# Patient Record
Sex: FEMALE | Race: OTHER | Employment: OTHER | URBAN - METROPOLITAN AREA
[De-identification: names, ages, dates, MRNs, and addresses within clinical notes are randomized per-mention and may not be internally consistent; named-entity substitution may affect disease eponyms.]

---

## 2018-11-28 NOTE — PROCEDURE NOTE: CLINICAL
PROCEDURE NOTE: Lucentis 0.5mg PFS OS. Diagnosis: Branch Retinal Vein Occlusion with Macular Edema. Anesthesia: Topical. Prep: Betadine Flush. Prior to injection, risks/benefits/alternatives discussed including but not limited to infection, loss of vision or eye, hemorrhage, cataract, glaucoma, retinal tears or detachment. The patient wished to proceed with treatment. Topical anesthesia was induced with Alcaine. Additional anesthesia was achieved using drop(s) or injection checked above. A drop of Povidone-iodine 5% ophthalmic solution was instilled over the injection site and in the inferior fornix. Betadine prep was performed. A single use prefilled syringe of intravitreal Lucentis 0.5mg/0.05ml was used and excess discarded. The needle was passed 3.0 mm posterior to the limbus in pseudophakic patients, and 3.5 mm posterior to the limbus in phakic patients. The remainder of the Lucentis 0.5mg in the single-use vial was then discarded in a medical waste disposal container. The eye was irrigated with sterile irrigating solution. Patient tolerated the procedure well. There were no complications. Post procedure instructions given. CF vision checked. Injection Time *. The patient was instructed to return for re-evaluation in approximately 4-12 weeks depending on his/her condition and was told to call immediately if vision decreases and/or if his/her eye becomes red, painful, and/or light sensitive. The patient was instructed to go to the emergency room or call 911 if unable to reach the doctor within an hour or two of trying or calling. [facility]

## 2019-01-02 NOTE — PROCEDURE NOTE: CLINICAL
PROCEDURE NOTE: Lucentis 0.5mg PFS OS. Diagnosis: Branch Retinal Vein Occlusion with Macular Edema. Anesthesia: Lidocaine 4%. Prep: Betadine Flush. Prior to injection, risks/benefits/alternatives discussed including but not limited to infection, loss of vision or eye, hemorrhage, cataract, glaucoma, retinal tears or detachment. The patient wished to proceed with treatment. Topical anesthesia was induced with Alcaine. Additional anesthesia was achieved using drop(s) or injection checked above. A drop of Povidone-iodine 5% ophthalmic solution was instilled over the injection site and in the inferior fornix. Betadine prep was performed. A single use prefilled syringe of intravitreal Lucentis 0.5mg/0.05ml was used and excess discarded. The needle was passed 3.0 mm posterior to the limbus in pseudophakic patients, and 3.5 mm posterior to the limbus in phakic patients. The remainder of the Lucentis 0.5mg in the single-use vial was then discarded in a medical waste disposal container. The eye was irrigated with sterile irrigating solution. Patient tolerated the procedure well. There were no complications. Post procedure instructions given. CF vision checked. Injection Time 9:05AM. The patient was instructed to return for re-evaluation in approximately 4-12 weeks depending on his/her condition and was told to call immediately if vision decreases and/or if his/her eye becomes red, painful, and/or light sensitive. The patient was instructed to go to the emergency room or call 911 if unable to reach the doctor within an hour or two of trying or calling. Brea Beverly

## 2019-02-27 NOTE — PATIENT DISCUSSION
Reviewed that they tend to [address] over a few weeks, and then may not happen for many years. Patient instructed to return if they do not clear over a few weeks for further evaluation.

## 2019-02-27 NOTE — PATIENT DISCUSSION
11 28 18 - Reviewed OPTIONS including AVASTIN/EYLEA/LUCENTIS and patient wants to proceed with LUCENTIS treatment AND REPEAT EVERY 5 WKS X 2.

## 2019-03-12 NOTE — PATIENT DISCUSSION
Reviewed that they tend to [address] over a few weeks, and then may not happen for many years. Patient instructed to return if they do not clear over a few weeks for further evaluation.

## 2019-03-18 NOTE — PROCEDURE NOTE: CLINICAL
PROCEDURE NOTE: Lucentis 0.5mg PFS OS. Diagnosis: Branch Retinal Vein Occlusion with Macular Edema. Anesthesia: Topical. Prep: Betadine Flush. Prior to injection, risks/benefits/alternatives discussed including but not limited to infection, loss of vision or eye, hemorrhage, cataract, glaucoma, retinal tears or detachment. The patient wished to proceed with treatment. Topical anesthesia was induced with Alcaine. Additional anesthesia was achieved using drop(s) or injection checked above. A drop of Povidone-iodine 5% ophthalmic solution was instilled over the injection site and in the inferior fornix. Betadine prep was performed. A single use prefilled syringe of intravitreal Lucentis 0.5mg/0.05ml was used and excess discarded. The needle was passed 3.0 mm posterior to the limbus in pseudophakic patients, and 3.5 mm posterior to the limbus in phakic patients. The remainder of the Lucentis 0.5mg in the single-use vial was then discarded in a medical waste disposal container. The eye was irrigated with sterile irrigating solution. Patient tolerated the procedure well. There were no complications. Post procedure instructions given. CF vision checked. Injection Time 9:54AM. The patient was instructed to return for re-evaluation in approximately 4-12 weeks depending on his/her condition and was told to call immediately if vision decreases and/or if his/her eye becomes red, painful, and/or light sensitive. The patient was instructed to go to the emergency room or call 911 if unable to reach the doctor within an hour or two of trying or calling. Rebecca Jones

## 2019-03-18 NOTE — PATIENT DISCUSSION
Reviewed that they tend to [address] over a few weeks, and then may not happen for many years. Patient instructed to return if they do not clear over a few weeks for further evaluation.

## 2019-05-06 NOTE — PATIENT DISCUSSION
Reviewed that they tend to [address] over a few weeks, and then may not happen for many years. Patient instructed to return if they do not clear over a few weeks for further evaluation.

## 2019-05-06 NOTE — PROCEDURE NOTE: CLINICAL
PROCEDURE NOTE: Lucentis 0.5mg PFS OS. Diagnosis: Branch Retinal Vein Occlusion with Macular Edema. Prior to injection, risks/benefits/alternatives discussed including but not limited to infection, loss of vision or eye, hemorrhage, cataract, glaucoma, retinal tears or detachment. The patient wished to proceed with treatment. Topical anesthesia was induced with Alcaine. Additional anesthesia was achieved using drop(s) or injection checked above. A drop of Povidone-iodine 5% ophthalmic solution was instilled over the injection site and in the inferior fornix. Betadine prep was performed. A single use prefilled syringe of intravitreal Lucentis 0.5mg/0.05ml was used and excess discarded. The needle was passed 3.0 mm posterior to the limbus in pseudophakic patients, and 3.5 mm posterior to the limbus in phakic patients. The remainder of the Lucentis 0.5mg in the single-use vial was then discarded in a medical waste disposal container. The eye was irrigated with sterile irrigating solution. Patient tolerated the procedure well. There were no complications. Post procedure instructions given. CF vision checked. Injection Time 9:13AM. The patient was instructed to return for re-evaluation in approximately 4-12 weeks depending on his/her condition and was told to call immediately if vision decreases and/or if his/her eye becomes red, painful, and/or light sensitive. The patient was instructed to go to the emergency room or call 911 if unable to reach the doctor within an hour or two of trying or calling. Maximiliana Braden PROCEDURE NOTE: Bandage Contact Lens OS. Diagnosis: Dry Eye Syndrome. Prior to treatment, the risks/benefits/alternatives were discussed. The patient wished to proceed with procedure. A topical anesthetic, proparacaine, was administered. A bandage contact lens was fitted for treatment of ocular surface disease. Night and day contact lens (-0.00, 13.8, 8.6), fitting and placement done without complication. Expiration date: *. Lot #: *. Patient tolerated procedure well. There were no complications. Post procedure instructions given. The patient tolerated the procedure well and left in good condition. Patient is instructed to make sure it is removed in 24 hours, either here, at home, or by another eye doctor. Yamilethvia Braden

## 2019-05-06 NOTE — PATIENT DISCUSSION
PT C/O IRRITATION FOLLOWING BRITTANY - SPK - BCL PLACED - PT TO REMOVE AT [facility] - HX OF CL USE.

## 2019-08-19 ENCOUNTER — CATARACT CONSULT (OUTPATIENT)
Dept: URBAN - METROPOLITAN AREA CLINIC 43 | Facility: CLINIC | Age: 74
End: 2019-08-19

## 2019-08-19 DIAGNOSIS — H18.59: ICD-10-CM

## 2019-08-19 DIAGNOSIS — H04.123: ICD-10-CM

## 2019-08-19 DIAGNOSIS — H25.811: ICD-10-CM

## 2019-08-19 DIAGNOSIS — H25.812: ICD-10-CM

## 2019-08-19 DIAGNOSIS — H43.393: ICD-10-CM

## 2019-08-19 PROCEDURE — 92134 CPTRZ OPH DX IMG PST SGM RTA: CPT

## 2019-08-19 PROCEDURE — 99204 OFFICE O/P NEW MOD 45 MIN: CPT

## 2019-08-19 PROCEDURE — 92025 CPTRIZED CORNEAL TOPOGRAPHY: CPT

## 2019-08-19 RX ORDER — PREDNISOLONE ACETATE 10 MG/ML: 1 SUSPENSION/ DROPS OPHTHALMIC

## 2019-08-19 RX ORDER — MOXIFLOXACIN HYDROCHLORIDE 5 MG/ML: 1 SOLUTION/ DROPS OPHTHALMIC

## 2019-08-19 ASSESSMENT — VISUAL ACUITY
OS_SC: >J12
OS_SC: 20/400+1
OS_BAT: 20/400
OS_CC: J2
OD_SC: CF 6FT
OS_CC: 20/50-2
OD_BAT: 20/400
OD_SC: >J12
OD_CC: J2
OD_CC: 20/30-1

## 2019-08-19 ASSESSMENT — TONOMETRY
OS_IOP_MMHG: 11
OD_IOP_MMHG: 11

## 2019-09-18 ENCOUNTER — ESTABLISHED PATIENT (OUTPATIENT)
Dept: URBAN - METROPOLITAN AREA SURGERY 14 | Facility: SURGERY | Age: 74
End: 2019-09-18

## 2019-09-18 ENCOUNTER — SURGERY/PROCEDURE (OUTPATIENT)
Dept: URBAN - METROPOLITAN AREA CLINIC 43 | Facility: CLINIC | Age: 74
End: 2019-09-18

## 2019-09-18 DIAGNOSIS — H25.811: ICD-10-CM

## 2019-09-18 DIAGNOSIS — H25.812: ICD-10-CM

## 2019-09-18 DIAGNOSIS — H04.123: ICD-10-CM

## 2019-09-18 DIAGNOSIS — H43.393: ICD-10-CM

## 2019-09-18 DIAGNOSIS — H18.59: ICD-10-CM

## 2019-09-18 PROCEDURE — 99211HP H&P OFFICE/OUTPATIENT VISIT, EST

## 2019-09-18 PROCEDURE — 65400 REMOVAL OF EYE LESION: CPT

## 2019-09-18 PROCEDURE — 65435 CURETTE/TREAT CORNEA: CPT

## 2019-09-19 ENCOUNTER — POST-OP (OUTPATIENT)
Dept: URBAN - METROPOLITAN AREA CLINIC 46 | Facility: CLINIC | Age: 74
End: 2019-09-19

## 2019-09-19 DIAGNOSIS — Z98.890: ICD-10-CM

## 2019-09-19 PROCEDURE — 99024 POSTOP FOLLOW-UP VISIT: CPT

## 2019-09-19 ASSESSMENT — VISUAL ACUITY
OD_CC: 20/60-2
OS_CC: 20/40

## 2019-09-23 ENCOUNTER — POST-OP (OUTPATIENT)
Dept: URBAN - METROPOLITAN AREA CLINIC 46 | Facility: CLINIC | Age: 74
End: 2019-09-23

## 2019-09-23 DIAGNOSIS — Z98.890: ICD-10-CM

## 2019-09-23 PROCEDURE — 99024 POSTOP FOLLOW-UP VISIT: CPT

## 2019-09-23 ASSESSMENT — VISUAL ACUITY
OS_CC: 20/50-2
OS_PH: 20/40+1
OD_PH: 20/40
OD_CC: 20/50-1

## 2019-10-03 ENCOUNTER — POST-OP (OUTPATIENT)
Dept: URBAN - METROPOLITAN AREA CLINIC 46 | Facility: CLINIC | Age: 74
End: 2019-10-03

## 2019-10-03 DIAGNOSIS — Z98.890: ICD-10-CM

## 2019-10-03 PROCEDURE — 99024 POSTOP FOLLOW-UP VISIT: CPT

## 2019-10-03 ASSESSMENT — VISUAL ACUITY
OD_SC: 20/400
OD_CC: 20/50
OS_SC: 20/200
OD_PH: 20/40-1
OS_PH: 20/40-2
OS_CC: 20/50

## 2019-10-03 ASSESSMENT — TONOMETRY: OD_IOP_MMHG: 12

## 2019-10-16 ENCOUNTER — ESTABLISHED PATIENT (OUTPATIENT)
Dept: URBAN - METROPOLITAN AREA SURGERY 14 | Facility: SURGERY | Age: 74
End: 2019-10-16

## 2019-10-16 ENCOUNTER — SURGERY/PROCEDURE (OUTPATIENT)
Dept: URBAN - METROPOLITAN AREA CLINIC 43 | Facility: CLINIC | Age: 74
End: 2019-10-16

## 2019-10-16 DIAGNOSIS — H18.59: ICD-10-CM

## 2019-10-16 DIAGNOSIS — Z98.890: ICD-10-CM

## 2019-10-16 PROCEDURE — 65400 REMOVAL OF EYE LESION: CPT

## 2019-10-16 PROCEDURE — 65435 CURETTE/TREAT CORNEA: CPT

## 2019-10-16 PROCEDURE — 99211HP H&P OFFICE/OUTPATIENT VISIT, EST

## 2019-10-17 ENCOUNTER — 1 DAY POST-OP (OUTPATIENT)
Dept: URBAN - METROPOLITAN AREA CLINIC 46 | Facility: CLINIC | Age: 74
End: 2019-10-17

## 2019-10-17 DIAGNOSIS — Z98.890: ICD-10-CM

## 2019-10-17 PROCEDURE — 99024 POSTOP FOLLOW-UP VISIT: CPT

## 2019-10-17 ASSESSMENT — VISUAL ACUITY
OD_CC: 20/40-1
OS_CC: 20/60-1
OS_PH: 20/40-1

## 2019-10-21 ENCOUNTER — POST-OP (OUTPATIENT)
Dept: URBAN - METROPOLITAN AREA CLINIC 46 | Facility: CLINIC | Age: 74
End: 2019-10-21

## 2019-10-21 DIAGNOSIS — Z98.890: ICD-10-CM

## 2019-10-21 PROCEDURE — 99024 POSTOP FOLLOW-UP VISIT: CPT

## 2019-10-21 ASSESSMENT — VISUAL ACUITY
OS_CC: 20/60
OD_CC: 20/30-2

## 2019-10-28 ENCOUNTER — POST-OP (OUTPATIENT)
Dept: URBAN - METROPOLITAN AREA CLINIC 46 | Facility: CLINIC | Age: 74
End: 2019-10-28

## 2019-10-28 DIAGNOSIS — Z98.890: ICD-10-CM

## 2019-10-28 PROCEDURE — 99024 POSTOP FOLLOW-UP VISIT: CPT

## 2019-10-28 ASSESSMENT — VISUAL ACUITY
OD_SC: 20/200
OD_CC: 20/40
OS_SC: 20/200
OS_CC: 20/50+2
OS_PH: 20/40-1

## 2019-11-18 NOTE — PATIENT DISCUSSION
Reviewed that they tend to [address] over a few weeks, and then may not happen for many years. Patient instructed to return if they do not clear over a few weeks for further evaluation.

## 2019-11-21 NOTE — PROCEDURE NOTE: CLINICAL
PROCEDURE NOTE: Eylea 2mg OS. Diagnosis: Branch Retinal Vein Occlusion with Macular Edema. Anesthesia: Lidocaine 4%. Prep: Betadine Drops. Prior to injection, risks/benefits/alternatives discussed including infection, loss of vision, hemorrhage, cataract, glaucoma, retinal tears or detachment. The patient wished to proceed with treatment. Betadine prep was performed. Topical anesthesia was induced with Alcaine. Additional anesthesia was achieved using drop(s) or injection checked above. A drop of Povidone-iodine 5% ophthalmic solution was instilled over the injection site and in the inferior fornix. Using the syringe provided, Eylea 2.0mg in 0.05 cc was injected into the vitreous cavity. The remainder of the Eylea in the single-use vial was then discarded in a medical waste disposal container. The needle was passed 3.0 mm posterior to the limbus in pseudophakic patients, and 3.5 mm posterior to the limbus in phakic patients. Patient tolerated procedure well. There were no complications. Injection time: *. The eye was irrigated with sterile irrigating solution. Post procedure instructions given. The patient was instructed to return for re-evaluation in approximately 4-12 weeks depending on his/her condition and was told to call immediately if vision decreases and/or if his/her eye becomes red, painful, and/or light sensitive. The patient was instructed to go to the emergency room or call 911 if unable to reach the doctor within an hour or two of trying or calling. The patient was instructed to use Artificial Tears q.i.d. p.r.n for comfort. Laxmi Mcintosh PROCEDURE NOTE: Bandage Contact Lens OS. Diagnosis: Dry Eye Syndrome. Anesthesia: Lidocaine 4%. Prior to treatment, the risks/benefits/alternatives were discussed. The patient wished to proceed with procedure. A topical anesthetic, proparacaine, was administered. A bandage contact lens was fitted for treatment of ocular surface disease. Night and day contact lens (-0.00, 13.8, 8.6), fitting and placement done without complication. Expiration date: *. Lot #: *. Patient tolerated procedure well. There were no complications. Post procedure instructions given. The patient tolerated the procedure well and left in good condition. Patient is instructed to make sure it is removed in 24 hours, either here, at home, or by another eye doctor. Laxmi Mcintosh

## 2019-11-21 NOTE — PATIENT DISCUSSION
Reviewed that they tend to [address] over a few weeks, and then may not happen for many years. Patient instructed to return if they do not clear over a few weeks for further evaluation.

## 2019-11-25 ENCOUNTER — POST-OP (OUTPATIENT)
Dept: URBAN - METROPOLITAN AREA CLINIC 46 | Facility: CLINIC | Age: 74
End: 2019-11-25

## 2019-11-25 DIAGNOSIS — Z98.890: ICD-10-CM

## 2019-11-25 PROCEDURE — 99024 POSTOP FOLLOW-UP VISIT: CPT

## 2019-11-25 ASSESSMENT — VISUAL ACUITY
OS_CC: 20/50+2
OS_PH: 20/30-2
OD_PH: 20/30-1
OD_CC: 20/50

## 2019-11-25 ASSESSMENT — TONOMETRY
OS_IOP_MMHG: 11
OD_IOP_MMHG: 11

## 2019-12-30 ENCOUNTER — POST-OP (OUTPATIENT)
Dept: URBAN - METROPOLITAN AREA CLINIC 46 | Facility: CLINIC | Age: 74
End: 2019-12-30

## 2019-12-30 DIAGNOSIS — Z98.890: ICD-10-CM

## 2019-12-30 PROCEDURE — 99024 POSTOP FOLLOW-UP VISIT: CPT

## 2019-12-30 ASSESSMENT — VISUAL ACUITY
OS_SC: 20/70
OS_PH: 20/40+2
OD_SC: 20/200
OD_PH: 20/50-1

## 2019-12-30 ASSESSMENT — TONOMETRY
OD_IOP_MMHG: 13
OS_IOP_MMHG: 13

## 2020-01-14 NOTE — PATIENT DISCUSSION
Reviewed that they tend to [address] over a few weeks, and then may not happen for many years. Patient instructed to return if they do not clear over a few weeks for further evaluation.

## 2020-01-14 NOTE — PROCEDURE NOTE: CLINICAL
PROCEDURE NOTE: Eylea 2mg OS. Diagnosis: Branch Retinal Vein Occlusion with Macular Edema. Anesthesia: Lidocaine 4%. Prep: Betadine Drops. Prior to injection, risks/benefits/alternatives discussed including infection, loss of vision, hemorrhage, cataract, glaucoma, retinal tears or detachment. The patient wished to proceed with treatment. Betadine prep was performed. Topical anesthesia was induced with Alcaine. Additional anesthesia was achieved using drop(s) or injection checked above. A drop of Povidone-iodine 5% ophthalmic solution was instilled over the injection site and in the inferior fornix. Using the syringe provided, Eylea 2.0mg in 0.05 cc was injected into the vitreous cavity. The remainder of the Eylea in the single-use vial was then discarded in a medical waste disposal container. The needle was passed 3.0 mm posterior to the limbus in pseudophakic patients, and 3.5 mm posterior to the limbus in phakic patients. Patient tolerated procedure well. There were no complications. Injection time: 1100. The eye was irrigated with sterile irrigating solution. Post procedure instructions given. The patient was instructed to return for re-evaluation in approximately 4-12 weeks depending on his/her condition and was told to call immediately if vision decreases and/or if his/her eye becomes red, painful, and/or light sensitive. The patient was instructed to go to the emergency room or call 911 if unable to reach the doctor within an hour or two of trying or calling. The patient was instructed to use Artificial Tears q.i.d. p.r.n for comfort. Grant Corona PROCEDURE NOTE: Bandage Contact Lens OS. Diagnosis: Dry Eye Syndrome. Prior to treatment, the risks/benefits/alternatives were discussed. The patient wished to proceed with procedure. A topical anesthetic, proparacaine, was administered. A bandage contact lens was fitted for treatment of ocular surface disease. Night and day contact lens (-0.00, 13.8, 8.6), fitting and placement done without complication. Expiration date: *. Lot #: *. Patient tolerated procedure well. There were no complications. Post procedure instructions given. The patient tolerated the procedure well and left in good condition. Patient is instructed to make sure it is removed in 24 hours, either here, at home, or by another eye doctor. Grant Corona

## 2020-03-17 NOTE — PATIENT DISCUSSION
Reviewed that they tend to [address] over a few weeks, and then may not happen for many years. Patient instructed to return if they do not clear over a few weeks for further evaluation.

## 2020-03-17 NOTE — PROCEDURE NOTE: CLINICAL
PROCEDURE NOTE: Eylea PFS OS. Diagnosis: Branch Retinal Vein Occlusion with Macular Edema. Anesthesia: Lidocaine 4%. Prep: Betadine Drops. Prior to injection, risks/benefits/alternatives discussed including but not limited to infection, loss of vision or eye, hemorrhage, cataract, glaucoma, retinal tears or detachment. The patient wished to proceed with treatment. Betadine prep was performed. Topical anesthesia was induced with Alcaine. Additional anesthesia was achieved using drop(s) or injection checked above. A drop of Povidone-iodine 5% ophthalmic solution was instilled over the injection site and in the inferior fornix. A single use prefilled syringe of intravitreal Eylea 2mg/0.05ml was used and excess was disposed of as waste. The needle was passed 3.0 mm posterior to the limbus in pseudophakic patients, and 3.5 mm posterior to the limbus in phakic patients. Injection time: *. Patient tolerated procedure well. There were no complications. The eye was irrigated with sterile irrigating solution. Post procedure instructions given. The patient was instructed to use Artificial Tears q.i.d. p.r.n for comfort. The patient was instructed to return for re-evaluation in approximately 4-12 weeks depending on his/her condition and was told to call immediately if vision decreases and/or if his/her eye becomes red, painful, and/or light sensitive. The patient was instructed to go to the emergency room or call 911 if unable to reach the doctor within an hour or two of trying or calling. Laxmi Mcintosh PROCEDURE NOTE: Bandage Contact Lens OS. Diagnosis: Dry Eye Syndrome. Anesthesia: Lidocaine 4%. Prior to treatment, the risks/benefits/alternatives were discussed. The patient wished to proceed with procedure. A topical anesthetic, proparacaine, was administered. A bandage contact lens was fitted for treatment of ocular surface disease. Night and day contact lens (-0.00, 13.8, 8.6), fitting and placement done without complication. Expiration date: *. Lot #: *. Patient tolerated procedure well. There were no complications. Post procedure instructions given. The patient tolerated the procedure well and left in good condition. Patient is instructed to make sure it is removed in 24 hours, either here, at home, or by another eye doctor. Laxmi Mcintosh

## 2020-05-19 NOTE — PROCEDURE NOTE: CLINICAL
PROCEDURE NOTE: Eylea PFS OS. Diagnosis: Branch Retinal Vein Occlusion with Macular Edema. Anesthesia: Topical. Prep: Betadine Drops. Prior to injection, risks/benefits/alternatives discussed including but not limited to infection, loss of vision or eye, hemorrhage, cataract, glaucoma, retinal tears or detachment. The patient wished to proceed with treatment. Betadine prep was performed. Topical anesthesia was induced with Alcaine. Additional anesthesia was achieved using drop(s) or injection checked above. A drop of Povidone-iodine 5% ophthalmic solution was instilled over the injection site and in the inferior fornix. A single use prefilled syringe of intravitreal Eylea 2mg/0.05ml was used and excess was disposed of as waste. The needle was passed 3.0 mm posterior to the limbus in pseudophakic patients, and 3.5 mm posterior to the limbus in phakic patients. Injection time: 2:35 PM.  Patient tolerated procedure well. There were no complications. The eye was irrigated with sterile irrigating solution. Post procedure instructions given. The patient was instructed to use Artificial Tears q.i.d. p.r.n for comfort. The patient was instructed to return for re-evaluation in approximately 4-12 weeks depending on his/her condition and was told to call immediately if vision decreases and/or if his/her eye becomes red, painful, and/or light sensitive. The patient was instructed to go to the emergency room or call 911 if unable to reach the doctor within an hour or two of trying or calling. Vero Spann PROCEDURE NOTE: Bandage Contact Lens OS. Diagnosis: Dry Eye Syndrome. Prior to treatment, the risks/benefits/alternatives were discussed. The patient wished to proceed with procedure. A topical anesthetic, proparacaine, was administered. A bandage contact lens was fitted for treatment of ocular surface disease. Night and day contact lens (-0.00, 13.8, 8.6), fitting and placement done without complication. Expiration date: *. Lot #: *. Patient tolerated procedure well. There were no complications. Post procedure instructions given. The patient tolerated the procedure well and left in good condition. Patient is instructed to make sure it is removed in 24 hours, either here, at home, or by another eye doctor. Vero Spann

## 2020-05-19 NOTE — PATIENT DISCUSSION
[address]  [address] [place] [id number]  Phone: [phone]  Fax: [phone]    7/16/2019    Kavya Kristyn  1979  [address] [facility] [address]      Dear Willa Song,    I regret to inform you that the provider(s) at [address] will no longer be able to provide your medical care. I recommend that you immediately find a new provider. We will continue to care for your urgent medical problems until 30 days from the date of this letter. We will also provide prescriptions for your current medications based on your past refill history to ensure you will have sufficient medications to last during the 30 days from the date of this letter. You may contact your insurance provider, managed care organization (if applicable) or local [employer] (if applicable number noted below) to obtain a current listing of physicians available to provide care. I urge you to see a new provider quickly so that there will be no interruption of your care. I wish you the best in your future medical care.       Sincerely,    Kamari Bull, CNP LUCENTIS AND REPEAT IN 7 WKS - TRACE EDEMA AT 5 WKS - IMPROVING - TX AND EXTEND.

## 2020-05-19 NOTE — PATIENT DISCUSSION
Reviewed that they tend to [address] over a few weeks, and then may not happen for many years. Patient instructed to return if they do not clear over a few weeks for further evaluation.

## 2020-07-28 NOTE — PROCEDURE NOTE: CLINICAL
PROCEDURE NOTE: Eylea PFS OS. Diagnosis: Branch Retinal Vein Occlusion with Macular Edema. Anesthesia: Topical. Prep: Betadine Drops. Prior to injection, risks/benefits/alternatives discussed including but not limited to infection, loss of vision or eye, hemorrhage, cataract, glaucoma, retinal tears or detachment. The patient wished to proceed with treatment. Betadine prep was performed. Topical anesthesia was induced with Alcaine. Additional anesthesia was achieved using drop(s) or injection checked above. A drop of Povidone-iodine 5% ophthalmic solution was instilled over the injection site and in the inferior fornix. A single use prefilled syringe of intravitreal Eylea 2mg/0.05ml was used and excess was disposed of as waste. The needle was passed 3.0 mm posterior to the limbus in pseudophakic patients, and 3.5 mm posterior to the limbus in phakic patients. Injection time: 550. Patient tolerated procedure well. There were no complications. The eye was irrigated with sterile irrigating solution. Post procedure instructions given. The patient was instructed to use Artificial Tears q.i.d. p.r.n for comfort. The patient was instructed to return for re-evaluation in approximately 4-12 weeks depending on his/her condition and was told to call immediately if vision decreases and/or if his/her eye becomes red, painful, and/or light sensitive. The patient was instructed to go to the emergency room or call 911 if unable to reach the doctor within an hour or two of trying or calling. Emmanuel Quan PROCEDURE NOTE: Bandage Contact Lens OS. Diagnosis: Dry Eye Syndrome. Anesthesia: Lidocaine 4%. Prior to treatment, the risks/benefits/alternatives were discussed. The patient wished to proceed with procedure. A topical anesthetic, proparacaine, was administered. A bandage contact lens was fitted for treatment of ocular surface disease. Night and day contact lens (-0.00, 13.8, 8.6), fitting and placement done without complication. Expiration date: 550. Lot #: *. Patient tolerated procedure well. There were no complications. Post procedure instructions given. The patient tolerated the procedure well and left in good condition. Patient is instructed to make sure it is removed in 24 hours, either here, at home, or by another eye doctor. Emmanuel Quan

## 2020-07-28 NOTE — PATIENT DISCUSSION
Reviewed that they tend to [address] over a few weeks, and then may not happen for many years. Patient instructed to return if they do not clear over a few weeks for further evaluation.

## 2020-09-29 NOTE — PROCEDURE NOTE: CLINICAL
PROCEDURE NOTE: Eylea PFS OS. Diagnosis: Branch Retinal Vein Occlusion with Macular Edema. Anesthesia: Lidocaine 4%. Prep: Betadine Drops. Prior to injection, risks/benefits/alternatives discussed including but not limited to infection, loss of vision or eye, hemorrhage, cataract, glaucoma, retinal tears or detachment. The patient wished to proceed with treatment. Betadine prep was performed. Topical anesthesia was induced with Alcaine. Additional anesthesia was achieved using drop(s) or injection checked above. A drop of Povidone-iodine 5% ophthalmic solution was instilled over the injection site and in the inferior fornix. A single use prefilled syringe of intravitreal Eylea 2mg/0.05ml was used and excess was disposed of as waste. The needle was passed 3.0 mm posterior to the limbus in pseudophakic patients, and 3.5 mm posterior to the limbus in phakic patients. Injection time: 1:45 PM.  Patient tolerated procedure well. There were no complications. The eye was irrigated with sterile irrigating solution. Post procedure instructions given. The patient was instructed to use Artificial Tears q.i.d. p.r.n for comfort. The patient was instructed to return for re-evaluation in approximately 4-12 weeks depending on his/her condition and was told to call immediately if vision decreases and/or if his/her eye becomes red, painful, and/or light sensitive. The patient was instructed to go to the emergency room or call 911 if unable to reach the doctor within an hour or two of trying or calling. Grant Corona

## 2020-09-29 NOTE — PATIENT DISCUSSION
Reviewed that they tend to [address] over a few weeks, and then may not happen for many years. Patient instructed to return if they do not clear over a few weeks for further evaluation.

## 2020-11-30 NOTE — PROCEDURE NOTE: CLINICAL
PROCEDURE NOTE: Eylea PFS OS. Diagnosis: Branch Retinal Vein Occlusion with Macular Edema. Anesthesia: Topical. Prep: Betadine Drops. Prior to injection, risks/benefits/alternatives discussed including but not limited to infection, loss of vision or eye, hemorrhage, cataract, glaucoma, retinal tears or detachment. The patient wished to proceed with treatment. Betadine prep was performed. Topical anesthesia was induced with Alcaine. Additional anesthesia was achieved using drop(s) or injection checked above. A drop of Povidone-iodine 5% ophthalmic solution was instilled over the injection site and in the inferior fornix. A single use prefilled syringe of intravitreal Eylea 2mg/0.05ml was used and excess was disposed of as waste. The needle was passed 3.0 mm posterior to the limbus in pseudophakic patients, and 3.5 mm posterior to the limbus in phakic patients. Injection time: 3:30PM.  Patient tolerated procedure well. There were no complications. The eye was irrigated with sterile irrigating solution. Post procedure instructions given. The patient was instructed to use Artificial Tears q.i.d. p.r.n for comfort. The patient was instructed to return for re-evaluation in approximately 4-12 weeks depending on his/her condition and was told to call immediately if vision decreases and/or if his/her eye becomes red, painful, and/or light sensitive. The patient was instructed to go to the emergency room or call 911 if unable to reach the doctor within an hour or two of trying or calling. Cassi Ibrahim

## 2021-02-01 NOTE — PATIENT DISCUSSION
Reviewed that they tend to [address] over a few weeks, and then may not happen for many years. Patient instructed to return if they do not clear over a few weeks for further evaluation.

## 2021-02-01 NOTE — PROCEDURE NOTE: CLINICAL
PROCEDURE NOTE: Eylea PFS OS. Diagnosis: Branch Retinal Vein Occlusion with Macular Edema. Anesthesia: Topical. Prep: Betadine Drops. Prior to injection, risks/benefits/alternatives discussed including but not limited to infection, loss of vision or eye, hemorrhage, cataract, glaucoma, retinal tears or detachment. The patient wished to proceed with treatment. Betadine prep was performed. Topical anesthesia was induced with Alcaine. Additional anesthesia was achieved using drop(s) or injection checked above. A drop of Povidone-iodine 5% ophthalmic solution was instilled over the injection site and in the inferior fornix. A single use prefilled syringe of intravitreal Eylea 2mg/0.05ml was used and excess was disposed of as waste. The needle was passed 3.0 mm posterior to the limbus in pseudophakic patients, and 3.5 mm posterior to the limbus in phakic patients. Injection time: 4:00PM.  Patient tolerated procedure well. There were no complications. The eye was irrigated with sterile irrigating solution. Post procedure instructions given. The patient was instructed to use Artificial Tears q.i.d. p.r.n for comfort. The patient was instructed to return for re-evaluation in approximately 4-12 weeks depending on his/her condition and was told to call immediately if vision decreases and/or if his/her eye becomes red, painful, and/or light sensitive. The patient was instructed to go to the emergency room or call 911 if unable to reach the doctor within an hour or two of trying or calling. Annette Madera

## 2021-02-22 NOTE — PATIENT DISCUSSION
11 28 18 - Reviewed OPTIONS including AVASTIN/EYLEA/LUCENTIS and patient wants to proceed with LUCENTIS treatment AND REPEAT EVERY 5 WKS X 2. PA requested please process as STAT!!

## 2021-02-23 NOTE — PATIENT DISCUSSION
continue to monitor for change. Explained limitation of changing eyeglasses. Detailed refraction trial framed. Patient saw improvement OD.

## 2021-02-23 NOTE — PATIENT DISCUSSION
detailed refraction, OS not as clear as OD, mild distortion OS, continue with Dr. Blossom García. Has an appointment in approximately one month.

## 2021-04-12 NOTE — PATIENT DISCUSSION
Reviewed that they tend to [address] over a few weeks, and then may not happen for many years. Patient instructed to return if they do not clear over a few weeks for further evaluation.

## 2021-04-12 NOTE — PROCEDURE NOTE: CLINICAL
PROCEDURE NOTE: Eylea PFS OS. Diagnosis: Branch Retinal Vein Occlusion with Macular Edema. Anesthesia: Lidocaine 4%. Prep: Betadine Drops. Prior to injection, risks/benefits/alternatives discussed including but not limited to infection, loss of vision or eye, hemorrhage, cataract, glaucoma, retinal tears or detachment. The patient wished to proceed with treatment. Betadine prep was performed. Topical anesthesia was induced with Alcaine. Additional anesthesia was achieved using drop(s) or injection checked above. A drop of Povidone-iodine 5% ophthalmic solution was instilled over the injection site and in the inferior fornix. A single use prefilled syringe of intravitreal Eylea 2mg/0.05ml was used and excess was disposed of as waste. The needle was passed 3.0 mm posterior to the limbus in pseudophakic patients, and 3.5 mm posterior to the limbus in phakic patients. Injection time: 6:45 PM.  Patient tolerated procedure well. There were no complications. The eye was irrigated with sterile irrigating solution. Post procedure instructions given. The patient was instructed to use Artificial Tears q.i.d. p.r.n for comfort. The patient was instructed to return for re-evaluation in approximately 4-12 weeks depending on his/her condition and was told to call immediately if vision decreases and/or if his/her eye becomes red, painful, and/or light sensitive. The patient was instructed to go to the emergency room or call 911 if unable to reach the doctor within an hour or two of trying or calling. Vanessa Cisneros

## 2021-11-04 NOTE — PATIENT DISCUSSION
Reviewed that they tend to [address] over a few weeks, and then may not happen for many years. Patient instructed to return if they do not clear over a few weeks for further evaluation.

## 2021-11-04 NOTE — PATIENT DISCUSSION
PT GETTING BY OK. Rounding (Video Assessment):  Yes    Intervention Initiated From:  [facility] / [facility]    Comments: Video rounds completed

## 2022-01-07 NOTE — PATIENT DISCUSSION
Reviewed that they tend to [address] over a few weeks, and then may not happen for many years. Patient instructed to return if they do not clear over a few weeks for further evaluation. no

## 2022-01-21 NOTE — PROCEDURE NOTE: CLINICAL
PROCEDURE NOTE: Eylea PFS OS. Diagnosis: Branch Retinal Vein Occlusion with Macular Edema. Anesthesia: Lidocaine 4%. Prep: Betadine Drops. Prior to injection, risks/benefits/alternatives discussed including but not limited to infection, loss of vision or eye, hemorrhage, cataract, glaucoma, retinal tears or detachment. The patient wished to proceed with treatment. Betadine prep was performed. Topical anesthesia was induced with Alcaine. Additional anesthesia was achieved using drop(s) or injection checked above. A drop of Povidone-iodine 5% ophthalmic solution was instilled over the injection site and in the inferior fornix. A single use prefilled syringe of intravitreal Eylea 2mg/0.05ml was used and excess was disposed of as waste. The needle was passed 3.0 mm posterior to the limbus in pseudophakic patients, and 3.5 mm posterior to the limbus in phakic patients. Injection time: 2:45PM.  Patient tolerated procedure well. There were no complications. The eye was irrigated with sterile irrigating solution. Post procedure instructions given. The patient was instructed to use Artificial Tears q.i.d. p.r.n for comfort. The patient was instructed to return for re-evaluation in approximately 4-12 weeks depending on his/her condition and was told to call immediately if vision decreases and/or if his/her eye becomes red, painful, and/or light sensitive. The patient was instructed to go to the emergency room or call 911 if unable to reach the doctor within an hour or two of trying or calling. Messi Purdy

## 2022-01-21 NOTE — PATIENT DISCUSSION
Reviewed that they tend to [address] over a few weeks, and then may not happen for many years. Patient instructed to return if they do not clear over a few weeks for further evaluation.

## 2022-04-08 NOTE — PATIENT DISCUSSION
Reviewed that they tend to [address] over a few weeks, and then may not happen for many years. Patient instructed to return if they do not clear over a few weeks for further evaluation.

## 2022-04-08 NOTE — PROCEDURE NOTE: CLINICAL
PROCEDURE NOTE: Eylea PFS OS. Diagnosis: Branch Retinal Vein Occlusion with Macular Edema. Anesthesia: Lidocaine 4%. Prep: Betadine Drops. Prior to injection, risks/benefits/alternatives discussed including but not limited to infection, loss of vision or eye, hemorrhage, cataract, glaucoma, retinal tears or detachment. The patient wished to proceed with treatment. Betadine prep was performed. Topical anesthesia was induced with Alcaine. Additional anesthesia was achieved using drop(s) or injection checked above. A drop of Povidone-iodine 5% ophthalmic solution was instilled over the injection site and in the inferior fornix. A single use prefilled syringe of intravitreal Eylea 2mg/0.05ml was used and excess was disposed of as waste. The needle was passed 3.0 mm posterior to the limbus in pseudophakic patients, and 3.5 mm posterior to the limbus in phakic patients. Injection time: *. Patient tolerated procedure well. There were no complications. The eye was irrigated with sterile irrigating solution. Post procedure instructions given. The patient was instructed to use Artificial Tears q.i.d. p.r.n for comfort. The patient was instructed to return for re-evaluation in approximately 4-12 weeks depending on his/her condition and was told to call immediately if vision decreases and/or if his/her eye becomes red, painful, and/or light sensitive. The patient was instructed to go to the emergency room or call 911 if unable to reach the doctor within an hour or two of trying or calling. Logan Arceo

## 2022-11-01 NOTE — PATIENT DISCUSSION
Patient instructed to STOP ACTIVITIES that could result in INJURY do to VISUAL IMPAIRMENT during the episode. If driving, instructed to pull over and wait till vision clears. 8

## 2023-01-30 ENCOUNTER — APPOINTMENT (RX ONLY)
Dept: URBAN - METROPOLITAN AREA CLINIC 150 | Facility: CLINIC | Age: 78
Setting detail: DERMATOLOGY
End: 2023-01-30

## 2023-01-30 DIAGNOSIS — L57.0 ACTINIC KERATOSIS: ICD-10-CM

## 2023-01-30 DIAGNOSIS — L81.4 OTHER MELANIN HYPERPIGMENTATION: ICD-10-CM

## 2023-01-30 DIAGNOSIS — D18.0 HEMANGIOMA: ICD-10-CM

## 2023-01-30 DIAGNOSIS — L82.1 OTHER SEBORRHEIC KERATOSIS: ICD-10-CM

## 2023-01-30 DIAGNOSIS — L72.8 OTHER FOLLICULAR CYSTS OF THE SKIN AND SUBCUTANEOUS TISSUE: ICD-10-CM

## 2023-01-30 DIAGNOSIS — Z71.89 OTHER SPECIFIED COUNSELING: ICD-10-CM

## 2023-01-30 DIAGNOSIS — D485 NEOPLASM OF UNCERTAIN BEHAVIOR OF SKIN: ICD-10-CM

## 2023-01-30 PROBLEM — D48.5 NEOPLASM OF UNCERTAIN BEHAVIOR OF SKIN: Status: ACTIVE | Noted: 2023-01-30

## 2023-01-30 PROBLEM — D18.01 HEMANGIOMA OF SKIN AND SUBCUTANEOUS TISSUE: Status: ACTIVE | Noted: 2023-01-30

## 2023-01-30 PROCEDURE — 17000 DESTRUCT PREMALG LESION: CPT | Mod: 59

## 2023-01-30 PROCEDURE — 11102 TANGNTL BX SKIN SINGLE LES: CPT

## 2023-01-30 PROCEDURE — ? TREATMENT REGIMEN

## 2023-01-30 PROCEDURE — ? BIOPSY BY SHAVE METHOD

## 2023-01-30 PROCEDURE — 17003 DESTRUCT PREMALG LES 2-14: CPT

## 2023-01-30 PROCEDURE — ? FULL BODY SKIN EXAM - DECLINED

## 2023-01-30 PROCEDURE — 99203 OFFICE O/P NEW LOW 30 MIN: CPT | Mod: 25

## 2023-01-30 PROCEDURE — ? LIQUID NITROGEN

## 2023-01-30 PROCEDURE — ? COUNSELING

## 2023-01-30 ASSESSMENT — LOCATION DETAILED DESCRIPTION DERM
LOCATION DETAILED: RIGHT VENTRAL PROXIMAL FOREARM
LOCATION DETAILED: RIGHT SUPERIOR PARIETAL SCALP
LOCATION DETAILED: LEFT INFERIOR ANTERIOR NECK
LOCATION DETAILED: LEFT MEDIAL FRONTAL SCALP
LOCATION DETAILED: LEFT SUPERIOR LATERAL NECK
LOCATION DETAILED: RIGHT INFERIOR MEDIAL FOREHEAD
LOCATION DETAILED: LEFT CENTRAL ZYGOMA

## 2023-01-30 ASSESSMENT — LOCATION SIMPLE DESCRIPTION DERM
LOCATION SIMPLE: RIGHT FOREHEAD
LOCATION SIMPLE: NECK
LOCATION SIMPLE: RIGHT FOREARM
LOCATION SIMPLE: LEFT ANTERIOR NECK
LOCATION SIMPLE: LEFT ZYGOMA
LOCATION SIMPLE: LEFT SCALP
LOCATION SIMPLE: SCALP

## 2023-01-30 ASSESSMENT — LOCATION ZONE DERM
LOCATION ZONE: SCALP
LOCATION ZONE: NECK
LOCATION ZONE: ARM
LOCATION ZONE: FACE

## 2023-01-30 NOTE — PROCEDURE: MIPS QUALITY
Quality 402: Tobacco Use And Help With Quitting Among Adolescents: Patient screened for tobacco and never smoked
Quality 47: Advance Care Plan: Advance Care Planning discussed and documented; advance care plan or surrogate decision maker documented in the medical record.
Quality 111:Pneumonia Vaccination Status For Older Adults: Pneumococcal vaccine (PPSV23) administered on or after patient’s 60th birthday and before the end of the measurement period
Quality 431: Preventive Care And Screening: Unhealthy Alcohol Use - Screening: Patient not identified as an unhealthy alcohol user when screened for unhealthy alcohol use using a systematic screening method
Detail Level: Detailed
Quality 226: Preventive Care And Screening: Tobacco Use: Screening And Cessation Intervention: Tobacco Screening not Performed for Medical Reasons
Quality 130: Documentation Of Current Medications In The Medical Record: Current Medications Documented

## 2023-05-11 NOTE — PATIENT DISCUSSION
My findings and recommendations are based on patient's symptoms, eye exam, diagnostic testing, and records. warm and dry

## 2023-12-12 ENCOUNTER — APPOINTMENT (RX ONLY)
Dept: URBAN - METROPOLITAN AREA CLINIC 150 | Facility: CLINIC | Age: 78
Setting detail: DERMATOLOGY
End: 2023-12-12

## 2023-12-12 DIAGNOSIS — L82.1 OTHER SEBORRHEIC KERATOSIS: ICD-10-CM

## 2023-12-12 DIAGNOSIS — L81.4 OTHER MELANIN HYPERPIGMENTATION: ICD-10-CM

## 2023-12-12 DIAGNOSIS — D18.0 HEMANGIOMA: ICD-10-CM

## 2023-12-12 DIAGNOSIS — Z85.828 PERSONAL HISTORY OF OTHER MALIGNANT NEOPLASM OF SKIN: ICD-10-CM | Status: RESOLVED

## 2023-12-12 PROBLEM — D18.01 HEMANGIOMA OF SKIN AND SUBCUTANEOUS TISSUE: Status: ACTIVE | Noted: 2023-12-12

## 2023-12-12 PROCEDURE — ? TREATMENT REGIMEN

## 2023-12-12 PROCEDURE — ? FULL BODY SKIN EXAM - DECLINED

## 2023-12-12 PROCEDURE — ? OTHER

## 2023-12-12 PROCEDURE — ? COUNSELING

## 2023-12-12 PROCEDURE — 99213 OFFICE O/P EST LOW 20 MIN: CPT

## 2023-12-12 ASSESSMENT — LOCATION DETAILED DESCRIPTION DERM
LOCATION DETAILED: RIGHT MEDIAL FRONTAL SCALP
LOCATION DETAILED: LEFT SUPERIOR PARIETAL SCALP

## 2023-12-12 ASSESSMENT — LOCATION SIMPLE DESCRIPTION DERM
LOCATION SIMPLE: RIGHT SCALP
LOCATION SIMPLE: SCALP

## 2023-12-12 ASSESSMENT — LOCATION ZONE DERM: LOCATION ZONE: SCALP

## 2023-12-12 NOTE — PROCEDURE: OTHER
Other (Free Text): Patient stated area was treated about 5 years ago
Detail Level: Zone
Render Risk Assessment In Note?: no
Note Text (......Xxx Chief Complaint.): This diagnosis correlates with the

## 2024-06-12 NOTE — PROCEDURE NOTE: CLINICAL
Dr. Cerna,   Please see my chart     Thank you   Ananya uAgust, Registered Nurse  [facility]      PROCEDURE NOTE: Eylea PFS OS. Diagnosis: Branch Retinal Vein Occlusion with Macular Edema. Anesthesia: Lidocaine 4%. Prep: Betadine Drops. Prior to injection, risks/benefits/alternatives discussed including but not limited to infection, loss of vision or eye, hemorrhage, cataract, glaucoma, retinal tears or detachment. The patient wished to proceed with treatment. Betadine prep was performed. Topical anesthesia was induced with Alcaine. Additional anesthesia was achieved using drop(s) or injection checked above. A drop of Povidone-iodine 5% ophthalmic solution was instilled over the injection site and in the inferior fornix. A single use prefilled syringe of intravitreal Eylea 2mg/0.05ml was used and excess was disposed of as waste. The needle was passed 3.0 mm posterior to the limbus in pseudophakic patients, and 3.5 mm posterior to the limbus in phakic patients. Injection time: 3:40 PM.  Patient tolerated procedure well. There were no complications. The eye was irrigated with sterile irrigating solution. Post procedure instructions given. The patient was instructed to use Artificial Tears q.i.d. p.r.n for comfort. The patient was instructed to return for re-evaluation in approximately 4-12 weeks depending on his/her condition and was told to call immediately if vision decreases and/or if his/her eye becomes red, painful, and/or light sensitive. The patient was instructed to go to the emergency room or call 911 if unable to reach the doctor within an hour or two of trying or calling. Olesya Gotti